# Patient Record
Sex: FEMALE | URBAN - METROPOLITAN AREA
[De-identification: names, ages, dates, MRNs, and addresses within clinical notes are randomized per-mention and may not be internally consistent; named-entity substitution may affect disease eponyms.]

---

## 2017-08-29 ENCOUNTER — APPOINTMENT (OUTPATIENT)
Dept: GENERAL RADIOLOGY | Facility: CLINIC | Age: 57
End: 2017-08-29
Attending: EMERGENCY MEDICINE

## 2017-08-29 ENCOUNTER — HOSPITAL ENCOUNTER (EMERGENCY)
Facility: CLINIC | Age: 57
Discharge: HOME | End: 2017-08-29
Attending: EMERGENCY MEDICINE | Admitting: EMERGENCY MEDICINE

## 2017-08-29 VITALS
HEART RATE: 78 BPM | OXYGEN SATURATION: 95 % | TEMPERATURE: 97.7 F | DIASTOLIC BLOOD PRESSURE: 78 MMHG | RESPIRATION RATE: 18 BRPM | SYSTOLIC BLOOD PRESSURE: 113 MMHG

## 2017-08-29 DIAGNOSIS — S20.212A CHEST WALL CONTUSION, LEFT, INITIAL ENCOUNTER: ICD-10-CM

## 2017-08-29 PROCEDURE — 99283 EMERGENCY DEPT VISIT LOW MDM: CPT

## 2017-08-29 PROCEDURE — 71020 XR CHEST 2 VW: CPT

## 2017-08-29 PROCEDURE — 25000132 ZZH RX MED GY IP 250 OP 250 PS 637: Performed by: EMERGENCY MEDICINE

## 2017-08-29 RX ORDER — HYDROCODONE BITARTRATE AND ACETAMINOPHEN 5; 325 MG/1; MG/1
2 TABLET ORAL ONCE
Status: COMPLETED | OUTPATIENT
Start: 2017-08-29 | End: 2017-08-29

## 2017-08-29 RX ORDER — LIDOCAINE 50 MG/G
1 PATCH TOPICAL EVERY 24 HOURS
Qty: 7 PATCH | Refills: 0 | Status: SHIPPED | OUTPATIENT
Start: 2017-08-29 | End: 2017-09-05

## 2017-08-29 RX ORDER — IBUPROFEN 600 MG/1
600 TABLET, FILM COATED ORAL EVERY 8 HOURS PRN
Qty: 30 TABLET | Refills: 0 | Status: SHIPPED | OUTPATIENT
Start: 2017-08-29

## 2017-08-29 RX ADMIN — HYDROCODONE BITARTRATE AND ACETAMINOPHEN 2 TABLET: 5; 325 TABLET ORAL at 21:56

## 2017-08-29 ASSESSMENT — ENCOUNTER SYMPTOMS
SHORTNESS OF BREATH: 1
ARTHRALGIAS: 0
HEMATURIA: 0
BACK PAIN: 0
NECK PAIN: 0
ABDOMINAL PAIN: 1
MYALGIAS: 0
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
## 2017-08-29 NOTE — ED AVS SNAPSHOT
[facility] Emergency Department    [address]    [facility] [address]    Phone:  [phone]    Fax:  [phone]                                       Kaia Saenz   MRN: [id number]    Department:  [facility] Emergency Department   Date of Visit:  8/29/2017           After Visit Summary Signature Page     I have received my discharge instructions, and my questions have been answered. I have discussed any challenges I see with this plan with the nurse or doctor.    ..........................................................................................................................................  Patient/Patient Representative Signature      ..........................................................................................................................................  Patient Representative Print Name and Relationship to Patient    ..................................................               ................................................  Date                                            Time    ..........................................................................................................................................  Reviewed by Signature/Title    ...................................................              ..............................................  Date                                                            Time

## 2017-08-30 NOTE — DISCHARGE INSTRUCTIONS
Air Bag Injury  In order to protect you during a crash, air bags must inflate very rapidly. They stop you from hitting the steering wheel or windshield during a front-end crash. They are very good at saving lives. But they blast out of the steering wheel hub or instrument panel at more than 100 mph. Because of this great force, the air bag may injure you when it strikes your body. Such injuries are usually minor scrapes (abrasions) and chemical burns to the face, hands, or arms.  Although rare, a more serious or even fatal injury can happen when someone is very close to the air bag module when it opens. To help prevent this:    Wear your seat belt at all times whether you are a  or a passenger. This will keep you at a safe distance from the air bag when it opens.    When driving, sit with your breastbone at least 10 inches away from the steering wheel.    Children younger than 13 are safest in the rear seat.    Never put a rear-facing infant restraint in the front seat. This puts the baby s head too close to the air bag. Severe head injury or death could occur if the air bag opens with the child in this position.  Home care  Follow these tips for home care if you have a scrape or chemical burn:    If you were given a bandage, change it once a day. If your bandage sticks to the wound, soak it in warm water until it loosens.    Wash the area with soap and water to remove all the cream or ointment. You may do this in a sink, under a tub faucet, or in the shower. Rinse off the soap and pat dry with a clean towel.    Reapply cream or ointment according to your healthcare provider s instructions. This will prevent infection and help keep the bandage from sticking.    Cover the wound with a fresh nonstick bandage. If the wound is on your face, you can just use the cream or ointment without the bandage, if you prefer.    Repeat this procedure once a day until the scrape becomes dry.    If the bandage gets wet or  dirty, change it as soon as you can.  You can take acetaminophen or ibuprofen to control pain, unless another pain medicine was prescribed. If you have chronic liver or kidney disease, talk with your healthcare provider before using these medicines. Also talk with your provider if you ever had a stomach ulcer or GI bleeding.  Follow-up care  Follow up with your healthcare provider, or as advised. Most skin wounds heal within 10 days. But you make get an infection even with proper treatment. Watch for early signs of infection listed below.  When to seek medical advice  Call your healthcare provider right away if any of these occur:    Pain in the wound that gets worse    Redness or swelling that gets worse    Pus coming from the wound    Fever of 100.4 F (38 C) or higher, or as directed by your healthcare provider    Headache or vision problems, or headache or vision problems that get worse    Neck, back, abdomen, arm, or leg pain, or pain in these areas that gets worse    Shortness of breath or chest pain that gets worse    Repeated vomiting, dizziness, or fainting    Excessive drowsiness or unable to wake up as usual    Confusion or change in behavior or speech, memory loss, or blurred vision  Date Last Reviewed: 9/1/2016 2000-2017 [employer]. [address] [place] [address]. All rights reserved. This information is not intended as a substitute for professional medical care. Always follow your healthcare professional's instructions.          Chest Contusion    A contusion is a bruise to the skin, muscle, or ribs. It may cause pain, tenderness, and swelling. It may turn the skin purple until it heals. Contusions take a few days to a few weeks to heal.  Home care  Follow these guidelines when caring for yourself at home:    Rest. Don t do any heavy lifting or strenuous activity. Don t do any activity that causes pain.    Put an ice pack on the injured area. Do this for 20 minutes every 1  to 2 hours the first day. You can make an ice pack by wrapping a plastic bag of ice cubes in a thin towel. Continue to use the ice pack 3 to 4 times a day for the next 2 days. Then use the ice pack as needed to ease pain and swelling.    After 1 to 2 days you may put a warm compress on the area. Do this for 10 minutes several times a day. A warm compress is a clean cloth that s damp with warm water.    Hold a pillow to the affected area when you cough. This will help ease pain.    You may use acetaminophen or ibuprofen to control pain, unless another pain medicine was prescribed. If you have chronic liver or kidney disease, talk with your health care provider before using these medicines. Also talk with your provider if you ve had a stomach ulcer or GI bleeding.  Follow-up care  Follow up with your health care provider during the next week, or as advised.  When to seek medical advice  Call your health care provider right away if any of these occur:    Shortness of breath, difficulty breathing, or breathing fast    Chest pain gets worse when you breathe    Severe pain that comes on suddenly or lasts more than an hour    Dizziness, weakness, or fainting    New abdominal pain or abdominal pain that gets worse     Fever of 101 F (38.3 C) or higher, or as directed by your health care provider  Date Last Reviewed: 2/15/2015    2740-3868 [employer]. [address] [place] [address], [place] [address]. All rights reserved. This information is not intended as a substitute for professional medical care. Always follow your healthcare professional's instructions.
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
